# Patient Record
Sex: FEMALE | Race: BLACK OR AFRICAN AMERICAN | Employment: OTHER | URBAN - METROPOLITAN AREA
[De-identification: names, ages, dates, MRNs, and addresses within clinical notes are randomized per-mention and may not be internally consistent; named-entity substitution may affect disease eponyms.]

---

## 2017-05-09 ENCOUNTER — HOSPITAL ENCOUNTER (OUTPATIENT)
Dept: NON INVASIVE DIAGNOSTICS | Age: 82
Discharge: HOME | End: 2017-05-09
Attending: INTERNAL MEDICINE
Payer: MEDICARE

## 2017-05-09 DIAGNOSIS — I10 HYPERTENSION: ICD-10-CM

## 2017-05-09 DIAGNOSIS — R60.0 EDEMA EXTREMITIES: ICD-10-CM

## 2017-05-09 PROCEDURE — 93306 TTE W/DOPPLER COMPLETE: CPT

## 2017-06-28 ENCOUNTER — OFFICE VISIT (OUTPATIENT)
Dept: CARDIOLOGY CLINIC | Age: 82
End: 2017-06-28

## 2017-06-28 VITALS
WEIGHT: 146 LBS | DIASTOLIC BLOOD PRESSURE: 74 MMHG | SYSTOLIC BLOOD PRESSURE: 110 MMHG | BODY MASS INDEX: 26.87 KG/M2 | HEIGHT: 62 IN | HEART RATE: 62 BPM

## 2017-06-28 DIAGNOSIS — I34.0 NON-RHEUMATIC MITRAL REGURGITATION: ICD-10-CM

## 2017-06-28 DIAGNOSIS — I50.21 SYSTOLIC CHF, ACUTE (HCC): Primary | ICD-10-CM

## 2017-06-28 DIAGNOSIS — I36.1 NON-RHEUMATIC TRICUSPID VALVE INSUFFICIENCY: ICD-10-CM

## 2017-06-28 DIAGNOSIS — E78.5 HYPERLIPIDEMIA, UNSPECIFIED HYPERLIPIDEMIA TYPE: ICD-10-CM

## 2017-06-28 DIAGNOSIS — I10 ESSENTIAL HYPERTENSION: ICD-10-CM

## 2017-06-28 DIAGNOSIS — I42.9 CARDIOMYOPATHY, UNSPECIFIED TYPE (HCC): ICD-10-CM

## 2017-06-28 RX ORDER — BISMUTH SUBSALICYLATE 262 MG
1 TABLET,CHEWABLE ORAL DAILY
COMMUNITY

## 2017-06-28 RX ORDER — LISINOPRIL 5 MG/1
5 TABLET ORAL DAILY
Qty: 30 TAB | Refills: 6 | Status: SHIPPED | OUTPATIENT
Start: 2017-06-28 | End: 2017-08-28 | Stop reason: SDUPTHER

## 2017-06-28 RX ORDER — FUROSEMIDE 40 MG/1
TABLET ORAL DAILY
COMMUNITY
End: 2017-07-26 | Stop reason: SDUPTHER

## 2017-06-28 RX ORDER — CARVEDILOL 3.12 MG/1
3.12 TABLET ORAL 2 TIMES DAILY WITH MEALS
Qty: 60 TAB | Refills: 6 | Status: SHIPPED | OUTPATIENT
Start: 2017-06-28 | End: 2017-09-06 | Stop reason: SDUPTHER

## 2017-06-28 RX ORDER — METOPROLOL SUCCINATE 25 MG/1
TABLET, EXTENDED RELEASE ORAL DAILY
COMMUNITY
End: 2017-06-28 | Stop reason: ALTCHOICE

## 2017-06-28 RX ORDER — VITAMIN E CAP 100 UNIT 100 UNIT
CAP ORAL DAILY
COMMUNITY

## 2017-06-28 RX ORDER — ASPIRIN 81 MG/1
TABLET ORAL DAILY
COMMUNITY

## 2017-06-28 NOTE — LETTER
[employer] 1934 6/28/2017 Dear Panchito Mcgregor MD 
 
I had the pleasure of evaluating  Ms. Luis Enriquez in office today. Below are the relevant portions of my assessment and plan of care. ICD-10-CM ICD-9-CM 1. Systolic CHF, acute (HCC) I50.21 428.21 SCHEDULE NUCLEAR STUDY  
  723.1 METABOLIC PANEL, BASIC  
 new increase sob and edema 2. Cardiomyopathy, unspecified type I42.9 425.4   
 ? etiology 
ischemic v/s non 3. Non-rheumatic mitral regurgitation I34.0 424.0   
 moderate 4. Non-rheumatic tricuspid valve insufficiency I36.1 424.2   
 severe 5. Essential hypertension I10 401.9   
 stable 6. Hyperlipidemia, unspecified hyperlipidemia type E78.5 272.4 Current Outpatient Prescriptions Medication Sig Dispense Refill  aspirin delayed-release 81 mg tablet Take  by mouth daily.  furosemide (LASIX) 40 mg tablet Take  by mouth daily.  potassium 99 mg tablet Take 99 mg by mouth daily.  multivitamin (ONE A DAY) tablet Take 1 Tab by mouth daily.  vitamin e (E GEMS) 100 unit capsule Take  by mouth daily.  carvedilol (COREG) 3.125 mg tablet Take 1 Tab by mouth two (2) times daily (with meals). 60 Tab 6  
 lisinopril (PRINIVIL, ZESTRIL) 5 mg tablet Take 1 Tab by mouth daily. 30 Tab 6 Orders Placed This Encounter  METABOLIC PANEL, BASIC Standing Status:   Future Standing Expiration Date:   7/28/2017  
 SCHEDULE NUCLEAR STUDY  
  lexiscan stress test  
  Standing Status:   Future Standing Expiration Date:   6/28/2018  aspirin delayed-release 81 mg tablet Sig: Take  by mouth daily.  DISCONTD: metoprolol succinate (TOPROL-XL) 25 mg XL tablet Sig: Take  by mouth daily.  furosemide (LASIX) 40 mg tablet Sig: Take  by mouth daily.  potassium 99 mg tablet Sig: Take 99 mg by mouth daily.  multivitamin (ONE A DAY) tablet Sig: Take 1 Tab by mouth daily.  vitamin e (E GEMS) 100 unit capsule Sig: Take  by mouth daily.  carvedilol (COREG) 3.125 mg tablet Sig: Take 1 Tab by mouth two (2) times daily (with meals). Dispense:  60 Tab Refill:  6  
 lisinopril (PRINIVIL, ZESTRIL) 5 mg tablet Sig: Take 1 Tab by mouth daily. Dispense:  30 Tab Refill:  6 If you have questions, please do not hesitate to call me. I look forward to following Ms. Warner along with you. Sincerely, Alma Salamanca MD

## 2017-06-28 NOTE — PROGRESS NOTES
HISTORY OF PRESENT ILLNESS  Nella Pantoja is a 80 y.o. female. Shortness of Breath   The history is provided by the patient. This is a new problem. The problem occurs frequently. Episode onset: 3 months. The problem has been gradually worsening. Pertinent negatives include no fever, no headaches, no cough, no sputum production, no hemoptysis, no wheezing, no PND, no orthopnea, no chest pain, no vomiting, no abdominal pain, no rash, no leg swelling and no claudication. Precipitated by: exertion. New Patient   The history is provided by the patient. Associated symptoms include shortness of breath. Pertinent negatives include no chest pain, no abdominal pain and no headaches. Cardiomyopathy   The history is provided by the patient. This is a new problem. The problem occurs constantly. The problem has not changed since onset. Associated symptoms include shortness of breath. Pertinent negatives include no chest pain, no abdominal pain and no headaches. Nothing aggravates the symptoms. CHF   The history is provided by the patient. This is a new problem. The problem occurs constantly. The problem has not changed since onset. Associated symptoms include shortness of breath. Pertinent negatives include no chest pain, no abdominal pain and no headaches. The symptoms are aggravated by exertion and walking. The symptoms are relieved by rest.       Review of Systems   Constitutional: Negative for chills and fever. HENT: Negative for nosebleeds. Eyes: Negative for blurred vision and double vision. Respiratory: Positive for shortness of breath. Negative for cough, hemoptysis, sputum production and wheezing. Cardiovascular: Negative for chest pain, palpitations, orthopnea, claudication, leg swelling and PND. Gastrointestinal: Negative for abdominal pain, heartburn, nausea and vomiting. Musculoskeletal: Negative for myalgias. Skin: Negative for rash.    Neurological: Negative for dizziness, weakness and headaches. Endo/Heme/Allergies: Does not bruise/bleed easily. Family History   Problem Relation Age of Onset    Heart Attack Mother     No Known Problems Father        Past Medical History:   Diagnosis Date    Essential hypertension     Hyperlipidemia        History reviewed. No pertinent surgical history. Social History   Substance Use Topics    Smoking status: Never Smoker    Smokeless tobacco: Never Used    Alcohol use No       No Known Allergies    Prior to Admission medications    Medication Sig Start Date End Date Taking? Authorizing Provider   aspirin delayed-release 81 mg tablet Take  by mouth daily. Yes Historical Provider   furosemide (LASIX) 40 mg tablet Take  by mouth daily. Yes Historical Provider   potassium 99 mg tablet Take 99 mg by mouth daily. Yes Historical Provider   multivitamin (ONE A DAY) tablet Take 1 Tab by mouth daily. Yes Historical Provider   vitamin e (E GEMS) 100 unit capsule Take  by mouth daily. Yes Historical Provider   carvedilol (COREG) 3.125 mg tablet Take 1 Tab by mouth two (2) times daily (with meals). 6/28/17  Yes Celestino Rahman MD   lisinopril (PRINIVIL, ZESTRIL) 5 mg tablet Take 1 Tab by mouth daily. 6/28/17  Yes Celestino Rahman MD         Visit Vitals    /74    Pulse 62    Ht 5' 2\" (1.575 m)    Wt 66.2 kg (146 lb)    BMI 26.7 kg/m2         Physical Exam   Constitutional: She is oriented to person, place, and time. She appears well-developed and well-nourished. HENT:   Head: Normocephalic and atraumatic. Eyes: Conjunctivae are normal.   Neck: Neck supple. No JVD present. No tracheal deviation present. No thyromegaly present. Cardiovascular: Normal rate and regular rhythm. PMI is not displaced. Exam reveals no gallop, no S3 and no decreased pulses. No murmur heard. Pulmonary/Chest: No respiratory distress. She has no wheezes. She has no rales. She exhibits no tenderness. Abdominal: Soft. There is no tenderness. Musculoskeletal: She exhibits no edema. Neurological: She is alert and oriented to person, place, and time. Skin: Skin is warm. Psychiatric: She has a normal mood and affect. Ms. Corby Rodríguez has a reminder for a \"due or due soon\" health maintenance. I have asked that she contact her primary care provider for follow-up on this health maintenance. I have personally reviewed patients ekg done at other facility. ,emiliawp-6/2017  SUMMARY:echo-5/2017  Left ventricle: Systolic function was severely reduced. Ejection fraction  was estimated to be 20 %. There was severe diffuse hypokinesis. Wall  thickness was mildly increased. Features were consistent with a  pseudonormal left ventricular filling pattern, with concomitant abnormal  relaxation and increased filling pressure (grade 2 diastolic dysfunction). Ventricular septum: There was paradoxical motion. These changes are  consistent with RV volume overload. Right ventricle: The ventricle was dilated. Systolic function was reduced. Systolic pressure could not be accurately determined, but appeared to be  increased. Left atrium: The atrium was severely dilated. Right atrium: The atrium was dilated. Mitral valve: There was moderate regurgitation. Tricuspid valve: There was moderate to severe regurgitation. Assessment         ICD-10-CM ICD-9-CM    1. Systolic CHF, acute (HCC) I50.21 428.21 SCHEDULE NUCLEAR STUDY     752.0 METABOLIC PANEL, BASIC    new increase sob and edema   2. Cardiomyopathy, unspecified type I42.9 425.4     ? etiology  ischemic v/s non   3. Non-rheumatic mitral regurgitation I34.0 424.0     moderate     4. Non-rheumatic tricuspid valve insufficiency I36.1 424.2     severe   5. Essential hypertension I10 401.9     stable   6.  Hyperlipidemia, unspecified hyperlipidemia type E78.5 272.4        Medications Discontinued During This Encounter   Medication Reason    metoprolol succinate (TOPROL-XL) 25 mg XL tablet Alternate Therapy       Orders Placed This Encounter    METABOLIC PANEL, BASIC     Standing Status:   Future     Standing Expiration Date:   7/28/2017    SCHEDULE NUCLEAR STUDY     lexiscan stress test     Standing Status:   Future     Standing Expiration Date:   6/28/2018    carvedilol (COREG) 3.125 mg tablet     Sig: Take 1 Tab by mouth two (2) times daily (with meals). Dispense:  60 Tab     Refill:  6    lisinopril (PRINIVIL, ZESTRIL) 5 mg tablet     Sig: Take 1 Tab by mouth daily. Dispense:  30 Tab     Refill:  6       Follow-up Disposition:  Return for F/u after tests.

## 2017-06-28 NOTE — MR AVS SNAPSHOT
Visit Information Date & Time Provider Department Dept. Phone Encounter #  
 6/28/2017  9:45 AM Belinda DeG uzman MD [facility] [address] [place] [address] [id number] Follow-up Instructions Return for F/u after tests. Your Appointments 7/26/2017  9:30 AM  
PROCEDURE with CA NUC [facility] (Mikaela Washington) Appt Note: saleh yasmin [address], [address] [facility] [address]  
[address], [address] [place] [address]  
  
    
 8/2/2017  9:30 AM  
Office Visit with Belinda De Guzman MD  
[facility]) Appt Note: post nuc and bmp [address], [address] [facility] [address]  
[address], [address] [place] [address] Upcoming Health Maintenance Date Due DTaP/Tdap/Td series (1 - Tdap) 8/20/1955 ZOSTER VACCINE AGE 60> 8/20/1994 GLAUCOMA SCREENING Q2Y 8/20/1999 OSTEOPOROSIS SCREENING (DEXA) 8/20/1999 Pneumococcal 65+ Low/Medium Risk (1 of 2 - PCV13) 8/20/1999 MEDICARE YEARLY EXAM 8/20/1999 INFLUENZA AGE 9 TO ADULT 8/1/2017 Allergies as of 6/28/2017  Review Complete On: 6/28/2017 By: Belinda De Guzman MD  
 No Known Allergies Current Immunizations  Never Reviewed No immunizations on file. Not reviewed this visit You Were Diagnosed With   
  
 Codes Comments Systolic CHF, acute ([facility]    -  Primary ICD-10-CM: I50.21 ICD-9-CM: 428.21, 428.0 new increase sob and edema Cardiomyopathy, unspecified type     ICD-10-CM: I42.9 ICD-9-CM: 425.4 ? etiology 
ischemic v/s non Non-rheumatic mitral regurgitation     ICD-10-CM: I34.0 ICD-9-CM: 424.0 moderate Non-rheumatic tricuspid valve insufficiency     ICD-10-CM: I36.1 ICD-9-CM: 424.2 severe Essential hypertension     ICD-10-CM: I10 
ICD-9-CM: 401.9 stable Hyperlipidemia, unspecified hyperlipidemia type     ICD-10-CM: E78.5 ICD-9-CM: 272.4 Vitals BP Pulse Height(growth percentile) Weight(growth percentile) BMI Smoking Status 110/74 62 5' 2\" (1.575 m) 146 lb (66.2 kg) 26.7 kg/m2 Never Smoker Vitals History BMI and BSA Data Body Mass Index Body Surface Area  
 26.7 kg/m 2 1.7 m 2 Preferred Pharmacy Pharmacy Name Phone [facility] [address] [phone] Your Updated Medication List  
  
   
This list is accurate as of: 6/28/17 10:33 AM.  Always use your most recent med list.  
  
  
  
  
 aspirin delayed-release 81 mg tablet Take  by mouth daily. carvedilol 3.125 mg tablet Commonly known as:  Daija Mateusz Take 1 Tab by mouth two (2) times daily (with meals). furosemide 40 mg tablet Commonly known as:  LASIX Take  by mouth daily. lisinopril 5 mg tablet Commonly known as:  Jodean Embs Take 1 Tab by mouth daily. multivitamin tablet Commonly known as:  ONE A DAY Take 1 Tab by mouth daily. potassium 99 mg tablet Take 99 mg by mouth daily. vitamin e 100 unit capsule Commonly known as:  E GEMS Take  by mouth daily. Prescriptions Sent to Pharmacy Refills  
 carvedilol (COREG) 3.125 mg tablet 6 Sig: Take 1 Tab by mouth two (2) times daily (with meals). Class: Normal  
 Pharmacy: [id number] [place] [address]. Ph #: [phone] Route: Oral  
 lisinopril (PRINIVIL, ZESTRIL) 5 mg tablet 6 Sig: Take 1 Tab by mouth daily. Class: Normal  
 Pharmacy: [id number] [place] [address]. Ph #: [phone] Route: Oral  
  
Follow-up Instructions Return for F/u after tests. To-Do List   
 07/05/2017 Lab:  METABOLIC PANEL, BASIC   
  
 07/05/2017 Nursing:  SCHEDULE NUCLEAR STUDY Introducing hospitals & HEALTH SERVICES!    
 [employer] introduces [employer] patient portal. Now you can access parts of your medical record, email your doctor's office, and request medication refills online. 1. In your internet browser, go to [email or web address] 2. Click on the First Time User? Click Here link in the Sign In box. You will see the New Member Sign Up page. 3. Enter your Idiro Access Code exactly as it appears below. You will not need to use this code after youve completed the sign-up process. If you do not sign up before the expiration date, you must request a new code. · Idiro Access Code: [email or web address] Expires: 9/26/2017 10:03 AM 
 
4. Enter the last four digits of your Social Security Number (xxxx) and Date of Birth (mm/dd/yyyy) as indicated and click Submit. You will be taken to the next sign-up page. 5. Create a Idiro ID. This will be your Idiro login ID and cannot be changed, so think of one that is secure and easy to remember. 6. Create a Idiro password. You can change your password at any time. 7. Enter your Password Reset Question and Answer. This can be used at a later time if you forget your password. 8. Enter your e-mail address. You will receive e-mail notification when new information is available in [address] 9. Click Sign Up. You can now view and download portions of your medical record. 10. Click the Download Summary menu link to download a portable copy of your medical information. If you have questions, please visit the Frequently Asked Questions section of the Idiro website. Remember, Idiro is NOT to be used for urgent needs. For medical emergencies, dial 911. Now available from your iPhone and Android! Please provide this summary of care documentation to your next provider. Your primary care clinician is listed as SITA KIMBLE. If you have any questions after today's visit, please call [phone].

## 2017-07-26 ENCOUNTER — CLINICAL SUPPORT (OUTPATIENT)
Dept: CARDIOLOGY CLINIC | Age: 82
End: 2017-07-26

## 2017-07-26 DIAGNOSIS — I50.21 SYSTOLIC CHF, ACUTE (HCC): Primary | ICD-10-CM

## 2017-07-26 DIAGNOSIS — I42.9 CARDIOMYOPATHY, UNSPECIFIED TYPE (HCC): ICD-10-CM

## 2017-07-26 DIAGNOSIS — I34.0 NON-RHEUMATIC MITRAL REGURGITATION: ICD-10-CM

## 2017-07-26 DIAGNOSIS — I10 ESSENTIAL HYPERTENSION: ICD-10-CM

## 2017-07-26 RX ORDER — FUROSEMIDE 40 MG/1
40 TABLET ORAL 2 TIMES DAILY
Qty: 60 TAB | Refills: 6 | Status: SHIPPED | OUTPATIENT
Start: 2017-07-26

## 2017-07-26 NOTE — PROGRESS NOTES
[facility]  [address], [address] [place] [address], [place], [address] [place] [address]  [phone] [place]  [phone] [place]       Name: Marlene Mccall         MRN#: [id number]        YOB: 1934   Gender: female Ht:5'2\" Wt:146 lbs       . Date of Rest/Stress Images: 7/26/2017   Referring Physician: Srinivasan Venegas MD  Ordering Physician: Tali Dewitt. Ramone Patel MD, [facility] [place]  Technologist: Chuck Gaucher. RIO Palacios, C.N.M.T  Diagnosis:  1. Systolic CHF, acute ([address]    2. Cardiomyopathy, unspecified type ([address]    3. Non-rheumatic mitral regurgitation    4. Essential hypertension          Rest/Stress Myoview SPECT Myocardial Perfusion Imaging with  Lexiscan Stress and gated SPECT Imaging      PROCEDURE:      Myocardial perfusion imaging was performed at rest approximately 30 mins following the intravenous injection,(Right hand ) of 11.8 mCi of Tc99m Myoview for evaluation of myocardial function and perfusion at rest.    Baseline Data:    Baseline EKG reveals sinus rhythm, low voltage, leftward axis. Baseline heart rate is 66. Baseline blood pressure is 132/64. Procedure: The patient was injected with 0.4 mg IV Lexiscan over a 30 second period. The patient had no significant symptoms. Heart rate increased from baseline to a heart rate of 71. Blood pressure decreased to 120/74. Electrocardiogram showed no significant ST-T changes. Diagnosis:   1. Nondiagnostic EKG portion of Lexiscan stress test.   2. Nuclear imaging report to follow. Pharmacological:  Patient was injected with . 4 mg/mL with Lexiscan intravenously over a period 10 to 20 sec. After pharmacologic stress, the patient was injected intravenously with 38.5 mCi of Tc99m Myoview. Gating post stress tomographic imaging performed approximately 45 minutes post tracer injection.  The data was reconstructed in the short, horizontal long and vertical long axis views and tomographic slices were generated. NUCLEAR IMAGING:    Findings:  1. Post-stress imaging in short axis, horizontal and vertical long axis views reveals normal perfusion in all areas. 2. Resting images also show normal perfusion in all areas. 3. Gated images show severe global hypokinesis, severely reduced systolic function. The ejection fraction is 25%. Diagnosis:   1. Abnormal scan. 2. Normal perfusion scan. 3. Global hypokinesis with severely reduced systolic function indicating nonischemic cardiomyopathy. 4. Moderate risk scan. 4. Moderate risk scan. Thank you for the referral.    E-signed and Interpreting Physician:    Dominique Gerber.  Valerie Reyna MD, [facility] [place]     Date of interpretation: 7/26/2017  Date of final report: 7/26/2017

## 2017-08-28 RX ORDER — LISINOPRIL 5 MG/1
5 TABLET ORAL DAILY
Qty: 30 TAB | Refills: 6 | Status: SHIPPED | OUTPATIENT
Start: 2017-08-28 | End: 2017-09-06 | Stop reason: SDUPTHER

## 2017-09-06 ENCOUNTER — OFFICE VISIT (OUTPATIENT)
Dept: CARDIOLOGY CLINIC | Age: 82
End: 2017-09-06

## 2017-09-06 VITALS
HEIGHT: 62 IN | SYSTOLIC BLOOD PRESSURE: 129 MMHG | DIASTOLIC BLOOD PRESSURE: 83 MMHG | WEIGHT: 135 LBS | BODY MASS INDEX: 24.84 KG/M2 | HEART RATE: 77 BPM

## 2017-09-06 DIAGNOSIS — I34.0 NON-RHEUMATIC MITRAL REGURGITATION: ICD-10-CM

## 2017-09-06 DIAGNOSIS — I10 ESSENTIAL HYPERTENSION: ICD-10-CM

## 2017-09-06 DIAGNOSIS — I42.9 CARDIOMYOPATHY, UNSPECIFIED TYPE (HCC): ICD-10-CM

## 2017-09-06 DIAGNOSIS — I50.21 SYSTOLIC CHF, ACUTE (HCC): Primary | ICD-10-CM

## 2017-09-06 RX ORDER — LISINOPRIL 10 MG/1
10 TABLET ORAL DAILY
Qty: 30 TAB | Refills: 6 | Status: SHIPPED | OUTPATIENT
Start: 2017-09-06 | End: 2017-09-25 | Stop reason: SDUPTHER

## 2017-09-06 RX ORDER — CARVEDILOL 6.25 MG/1
6.25 TABLET ORAL 2 TIMES DAILY WITH MEALS
Qty: 60 TAB | Refills: 6 | Status: SHIPPED | OUTPATIENT
Start: 2017-09-06 | End: 2017-11-01

## 2017-09-06 NOTE — MR AVS SNAPSHOT
Visit Information Date & Time Provider Department Dept. Phone Encounter #  
 9/6/2017 11:15 AM Benny Swift MD [facility] [id number] Follow-up Instructions Return in about 8 weeks (around 11/1/2017). Your Appointments 9/27/2017 12:45 PM  
PROCEDURE with CA ECHO [facility] [place] ([address]) Appt Note: [address], [address] [place] [address]  
[address], [address] [address]  
  
    
 10/18/2017 11:30 AM  
ESTABLISHED PATIENT with Benny Swift MD  
[facility]) Appt Note: 8 weeks post echo [address], [address] [place] [address]  
[address], [address] [address]  [address] Upcoming Health Maintenance Date Due DTaP/Tdap/Td series (1 - Tdap) 8/20/1955 ZOSTER VACCINE AGE 60> 6/20/1994 GLAUCOMA SCREENING Q2Y 8/20/1999 OSTEOPOROSIS SCREENING (DEXA) 8/20/1999 Pneumococcal 65+ Low/Medium Risk (1 of 2 - PCV13) 8/20/1999 MEDICARE YEARLY EXAM 8/20/1999 INFLUENZA AGE 9 TO ADULT 8/1/2017 Allergies as of 9/6/2017  Review Complete On: 9/6/2017 By: Benny Swift MD  
 No Known Allergies Current Immunizations  Never Reviewed No immunizations on file. Not reviewed this visit You Were Diagnosed With   
  
 Codes Comments Systolic [facility], [facility] ([facility]    -  Primary ICD-10-CM: I50.21 ICD-9-CM: 428.21, 428.0 sob improving 
adjust meds Cardiomyopathy, unspecified type ([facility]     ICD-10-CM: I42.9 ICD-9-CM: 425.4 non ischemic Non-rheumatic mitral regurgitation     ICD-10-CM: I34.0 ICD-9-CM: 424.0 stable Essential hypertension     ICD-10-CM: I10 
ICD-9-CM: 401.9 controlled Vitals BP Pulse Height(growth percentile) Weight(growth percentile) BMI Smoking Status 129/83 77 5' 2\" (1.575 m) 135 lb (61.2 kg) 24.69 kg/m2 Never Smoker Vitals History BMI and BSA Data Body Mass Index Body Surface Area  
 24.69 kg/m 2 1.64 m 2 Preferred Pharmacy Pharmacy Name Phone [facility] [address]. [phone] Your Updated Medication List  
  
   
This list is accurate as of: 9/6/17 11:59 AM.  Always use your most recent med list.  
  
  
  
  
 aspirin delayed-release 81 mg tablet Take  by mouth daily. carvedilol 6.25 mg tablet Commonly known as:  Ruthann Rod Take 1 Tab by mouth two (2) times daily (with meals). furosemide 40 mg tablet Commonly known as:  LASIX Take 1 Tab by mouth two (2) times a day. lisinopril 10 mg tablet Commonly known as:  Tanya Pan Take 1 Tab by mouth daily. multivitamin tablet Commonly known as:  ONE A DAY Take 1 Tab by mouth daily. potassium 99 mg tablet Take 99 mg by mouth daily. vitamin e 100 unit capsule Commonly known as:  E GEMS Take  by mouth daily. Prescriptions Sent to Pharmacy Refills  
 lisinopril (PRINIVIL, ZESTRIL) 10 mg tablet 6 Sig: Take 1 Tab by mouth daily. Class: Normal  
 Pharmacy: [address] [place] [address]. Ph #: [phone] Route: Oral  
 carvedilol (COREG) 6.25 mg tablet 6 Sig: Take 1 Tab by mouth two (2) times daily (with meals). Class: Normal  
 Pharmacy: [address] [place] [address]. Ph #: [phone] Route: Oral  
  
Follow-up Instructions Return in about 8 weeks (around 11/1/2017). To-Do List   
 10/17/2017 Cardiac Services:  2D ECHO COMPLETE ADULT (TTE) Introducing [facility] & HEALTH SERVICES! [address] introduces [employer] patient portal. Now you can access parts of your medical record, email your doctor's office, and request medication refills online. 1. In your internet browser, go to [email or web address] 2. Click on the First Time User? Click Here link in the Sign In box. You will see the New Member Sign Up page. 3. Enter your CodeRyte Access Code exactly as it appears below. You will not need to use this code after youve completed the sign-up process. If you do not sign up before the expiration date, you must request a new code. · CodeR[employer]te Access Code: [email or web address] Expires: 9/26/2017 10:03 AM 
 
4. Enter the last four digits of your Social Security Number (xxxx) and Date of Birth (mm/dd/yyyy) as indicated and click Submit. You will be taken to the next sign-up page. 5. Create a CodeRyte ID. This will be your CodeR[employer]te login ID and cannot be changed, so think of one that is secure and easy to remember. 6. Create a CodeRyte password. You can change your password at any time. 7. Enter your Password Reset Question and Answer. This can be used at a later time if you forget your password. 8. Enter your e-mail address. You will receive e-mail notification when new information is available in [address] 9. Click Sign Up. You can now view and download portions of your medical record. 10. Click the Download Summary menu link to download a portable copy of your medical information. If you have questions, please visit the Frequently Asked Questions section of the CodeR[employer] website. Remember, CodeRyte is NOT to be used for urgent needs. For medical emergencies, dial 911. Now available from your iPhone and Android! Please provide this summary of care documentation to your next provider. Your primary care clinician is listed as [address] [place] [address]. If you have any questions after today's visit, please call [phone].

## 2017-09-06 NOTE — PROGRESS NOTES
HISTORY OF PRESENT ILLNESS  Yobany Lawson is a 80 y.o. female. HPI Comments: Patient is here for follow up of diagnostic tests. Results will be discussed. CHF   The history is provided by the patient. This is a new problem. The problem occurs constantly. The problem has not changed since onset. Associated symptoms include shortness of breath. Pertinent negatives include no chest pain, no abdominal pain and no headaches. The symptoms are aggravated by exertion and walking. The symptoms are relieved by rest.   Cardiomyopathy   The history is provided by the patient. This is a new problem. The problem occurs constantly. The problem has not changed since onset. Associated symptoms include shortness of breath. Pertinent negatives include no chest pain, no abdominal pain and no headaches. Nothing aggravates the symptoms. Valvular Heart Disease   Associated symptoms include shortness of breath. Pertinent negatives include no chest pain, no abdominal pain and no headaches. Hypertension   Associated symptoms include shortness of breath. Pertinent negatives include no chest pain, no abdominal pain and no headaches. Cholesterol Problem   Associated symptoms include shortness of breath. Pertinent negatives include no chest pain, no abdominal pain and no headaches. Shortness of Breath   The history is provided by the patient. This is a new problem. The problem occurs frequently. Episode onset: 3 months. The problem has been gradually worsening. Associated symptoms include leg swelling. Pertinent negatives include no fever, no headaches, no cough, no sputum production, no hemoptysis, no wheezing, no PND, no orthopnea, no chest pain, no vomiting, no abdominal pain, no rash and no claudication. Precipitated by: exertion. Review of Systems   Constitutional: Negative for chills and fever. HENT: Negative for nosebleeds. Eyes: Negative for blurred vision and double vision.    Respiratory: Positive for shortness of breath. Negative for cough, hemoptysis, sputum production and wheezing. Cardiovascular: Positive for leg swelling. Negative for chest pain, palpitations, orthopnea, claudication and PND. Gastrointestinal: Negative for abdominal pain, heartburn, nausea and vomiting. Musculoskeletal: Negative for myalgias. Skin: Negative for rash. Neurological: Negative for dizziness, weakness and headaches. Endo/Heme/Allergies: Does not bruise/bleed easily. Family History   Problem Relation Age of Onset    Heart Attack Mother     No Known Problems Father        Past Medical History:   Diagnosis Date    Essential hypertension     Hyperlipidemia        No past surgical history on file. Social History   Substance Use Topics    Smoking status: Never Smoker    Smokeless tobacco: Never Used    Alcohol use No       No Known Allergies    Prior to Admission medications    Medication Sig Start Date End Date Taking? Authorizing Provider   lisinopril (PRINIVIL, ZESTRIL) 10 mg tablet Take 1 Tab by mouth daily. 9/6/17  Yes Azael Glover MD   carvedilol (COREG) 6.25 mg tablet Take 1 Tab by mouth two (2) times daily (with meals). 9/6/17  Yes Azael Glover MD   furosemide (LASIX) 40 mg tablet Take 1 Tab by mouth two (2) times a day. 7/26/17  Yes Azael Glover MD   aspirin delayed-release 81 mg tablet Take  by mouth daily. Yes Historical Provider   potassium 99 mg tablet Take 99 mg by mouth daily. Yes Historical Provider   multivitamin (ONE A DAY) tablet Take 1 Tab by mouth daily. Yes Historical Provider   vitamin e (E GEMS) 100 unit capsule Take  by mouth daily. Yes Historical Provider         Visit Vitals    /83    Pulse 77    Ht 5' 2\" (1.575 m)    Wt 61.2 kg (135 lb)    BMI 24.69 kg/m2         Physical Exam   Constitutional: She is oriented to person, place, and time. She appears well-developed and well-nourished. HENT:   Head: Normocephalic and atraumatic.    Eyes: Conjunctivae are normal.   Neck: Neck supple. No JVD present. No tracheal deviation present. No thyromegaly present. Cardiovascular: Normal rate and regular rhythm. PMI is not displaced. Exam reveals no gallop, no S3 and no decreased pulses. No murmur heard. Pulmonary/Chest: No respiratory distress. She has no wheezes. She has no rales. She exhibits no tenderness. Abdominal: Soft. There is no tenderness. Musculoskeletal: She exhibits edema. Neurological: She is alert and oriented to person, place, and time. Skin: Skin is warm. Psychiatric: She has a normal mood and affect. Ms. Rosalba Barboza has a reminder for a \"due or due soon\" health maintenance. I have asked that she contact her primary care provider for follow-up on this health maintenance. I have personally reviewed patients ekg done at other facility. emilia Santoswp-2017  SUMMARY:echo-2017  Left ventricle: Systolic function was severely reduced. Ejection fraction  was estimated to be 20 %. There was severe diffuse hypokinesis. Wall  thickness was mildly increased. Features were consistent with a  pseudonormal left ventricular filling pattern, with concomitant abnormal  relaxation and increased filling pressure (grade 2 diastolic dysfunction). Ventricular septum: There was paradoxical motion. These changes are  consistent with RV volume overload. Right ventricle: The ventricle was dilated. Systolic function was reduced. Systolic pressure could not be accurately determined, but appeared to be  increased. Left atrium: The atrium was severely dilated. Right atrium: The atrium was dilated. Mitral valve: There was moderate regurgitation. Tricuspid valve: There was moderate to severe regurgitation. NUCLEAR IMAGIN2017     Findings:  1. Post-stress imaging in short axis, horizontal and vertical long axis views reveals normal perfusion in all areas. 2. Resting images also show normal perfusion in all areas.    3. Gated images show severe global hypokinesis, severely reduced systolic function. The ejection fraction is 25%. Diagnosis:   1. Abnormal scan. 2. Normal perfusion scan. 3. Global hypokinesis with severely reduced systolic function indicating nonischemic cardiomyopathy. 4. Moderate risk scan. 4. Moderate risk scan. Assessment         ICD-10-CM ICD-9-CM    1. Systolic CHF, acute (HCC) I50.21 428.21 2D ECHO COMPLETE ADULT (TTE)     428.0     sob improving  adjust meds   2. Cardiomyopathy, unspecified type ([facility] [address] I42.9 425.4     non ischemic   3. Non-rheumatic mitral regurgitation I34.0 424.0     stable   4. Essential hypertension I10 401.9     controlled   9/2017  Increase coreg and lisinopril  Echo in 7-8 weeks-decide on icd    Medications Discontinued During This Encounter   Medication Reason    lisinopril (PRINIVIL, ZESTRIL) 5 mg tablet Reorder    carvedilol (COREG) 3.125 mg tablet Reorder       Orders Placed This Encounter    2D ECHO COMPLETE ADULT (TTE)     Standing Status:   Future     Standing Expiration Date:   3/5/2018     Order Specific Question:   Reason for Exam:     Answer:   see diagnosis    lisinopril (PRINIVIL, ZESTRIL) 10 mg tablet     Sig: Take 1 Tab by mouth daily. Dispense:  30 Tab     Refill:  6    carvedilol (COREG) 6.25 mg tablet     Sig: Take 1 Tab by mouth two (2) times daily (with meals). Dispense:  60 Tab     Refill:  6       Follow-up Disposition:  Return in about 8 weeks (around 11/1/2017).

## 2017-09-06 NOTE — PROGRESS NOTES
1. Have you been to the ER, urgent care clinic since your last visit? Hospitalized since your last visit? No     2. Have you seen or consulted any other health care providers outside of the [address] [place] [address] since your last visit? Include any pap smears or colon screening. unsure     3. Since your last visit, have you had any of the following symptoms?     no    4. Have you had any blood work, X-rays or cardiac testing? No    5. Where do you normally have your labs drawn? PCP    6. Do you need any refills today?    no

## 2017-09-06 NOTE — LETTER
[employer] 1934 9/6/2017 Dear Jarrod Berry MD 
 
I had the pleasure of evaluating  Ms. Kathryn Pemberton in office today. Below are the relevant portions of my assessment and plan of care. ICD-10-CM ICD-9-CM 1. Systolic CHF, acute (HCC) I50.21 428.21 2D ECHO COMPLETE ADULT (TTE) 428.0   
 sob improving 
adjust meds 2. Cardiomyopathy, unspecified type ([address] I42.9 425.4   
 non ischemic 3. Non-rheumatic mitral regurgitation I34.0 424.0   
 stable 4. Essential hypertension I10 401.9   
 controlled Current Outpatient Prescriptions Medication Sig Dispense Refill  lisinopril (PRINIVIL, ZESTRIL) 10 mg tablet Take 1 Tab by mouth daily. 30 Tab 6  carvedilol (COREG) 6.25 mg tablet Take 1 Tab by mouth two (2) times daily (with meals). 60 Tab 6  furosemide (LASIX) 40 mg tablet Take 1 Tab by mouth two (2) times a day. 60 Tab 6  
 aspirin delayed-release 81 mg tablet Take  by mouth daily.  potassium 99 mg tablet Take 99 mg by mouth daily.  multivitamin (ONE A DAY) tablet Take 1 Tab by mouth daily.  vitamin e (E GEMS) 100 unit capsule Take  by mouth daily. Orders Placed This Encounter  2D ECHO COMPLETE ADULT (TTE) Standing Status:   Future Standing Expiration Date:   3/5/2018 Order Specific Question:   Reason for Exam: Answer:   see diagnosis  lisinopril (PRINIVIL, ZESTRIL) 10 mg tablet Sig: Take 1 Tab by mouth daily. Dispense:  30 Tab Refill:  6  
 carvedilol (COREG) 6.25 mg tablet Sig: Take 1 Tab by mouth two (2) times daily (with meals). Dispense:  60 Tab Refill:  6 If you have questions, please do not hesitate to call me. I look forward to following Ms. Warner along with you. Sincerely, Faye Shirley MD

## 2017-09-12 ENCOUNTER — TELEPHONE (OUTPATIENT)
Dept: CARDIOLOGY CLINIC | Age: 82
End: 2017-09-12

## 2017-09-12 DIAGNOSIS — I50.21 SYSTOLIC CHF, ACUTE (HCC): Primary | ICD-10-CM

## 2017-09-12 NOTE — TELEPHONE ENCOUNTER
Daughter called and states that since increase of Lisinopril and Coreg patient is feeling sluggish, dizzy and tired. /87 P 56.  Please advise

## 2017-09-25 RX ORDER — LISINOPRIL 10 MG/1
10 TABLET ORAL DAILY
Qty: 90 TAB | Refills: 1 | Status: SHIPPED | OUTPATIENT
Start: 2017-09-25 | End: 2017-11-01

## 2017-10-18 ENCOUNTER — CLINICAL SUPPORT (OUTPATIENT)
Dept: CARDIOLOGY CLINIC | Age: 82
End: 2017-10-18

## 2017-10-18 DIAGNOSIS — I50.21 SYSTOLIC CHF, ACUTE (HCC): ICD-10-CM

## 2017-11-01 ENCOUNTER — OFFICE VISIT (OUTPATIENT)
Dept: CARDIOLOGY CLINIC | Age: 82
End: 2017-11-01

## 2017-11-01 VITALS
DIASTOLIC BLOOD PRESSURE: 69 MMHG | HEIGHT: 62 IN | SYSTOLIC BLOOD PRESSURE: 111 MMHG | BODY MASS INDEX: 20.43 KG/M2 | HEART RATE: 69 BPM | WEIGHT: 111 LBS

## 2017-11-01 DIAGNOSIS — I36.1 NON-RHEUMATIC TRICUSPID VALVE INSUFFICIENCY: ICD-10-CM

## 2017-11-01 DIAGNOSIS — I50.21 SYSTOLIC CHF, ACUTE (HCC): Primary | ICD-10-CM

## 2017-11-01 DIAGNOSIS — I34.0 NON-RHEUMATIC MITRAL REGURGITATION: ICD-10-CM

## 2017-11-01 DIAGNOSIS — I10 ESSENTIAL HYPERTENSION: ICD-10-CM

## 2017-11-01 DIAGNOSIS — I42.9 CARDIOMYOPATHY, UNSPECIFIED TYPE (HCC): ICD-10-CM

## 2017-11-01 RX ORDER — CARVEDILOL 3.12 MG/1
TABLET ORAL 2 TIMES DAILY WITH MEALS
COMMUNITY

## 2017-11-01 RX ORDER — LISINOPRIL 5 MG/1
TABLET ORAL DAILY
COMMUNITY

## 2017-11-01 NOTE — LETTER
[employer] 1934 11/1/2017 Dear Hilaria Horn MD 
 
I had the pleasure of evaluating  Ms. Renetta Ovalle in office today. Below are the relevant portions of my assessment and plan of care. ICD-10-CM ICD-9-CM 1. Systolic CHF, acute (HCC) I50.21 428.21   
  428.0   
 still fatigue nyha class3 2. Cardiomyopathy, unspecified type ([address] I42.9 425.4   
 ef remains at 20% 3. Non-rheumatic mitral regurgitation I34.0 424.0   
 stable 4. Non-rheumatic tricuspid valve insufficiency I36.1 424.2   
 stable 5. Essential hypertension I10 401.9   
 controlled Current Outpatient Prescriptions Medication Sig Dispense Refill  carvedilol (COREG) 3.125 mg tablet Take  by mouth two (2) times daily (with meals).  lisinopril (PRINIVIL, ZESTRIL) 5 mg tablet Take  by mouth daily.  furosemide (LASIX) 40 mg tablet Take 1 Tab by mouth two (2) times a day. 60 Tab 6  
 aspirin delayed-release 81 mg tablet Take  by mouth daily.  potassium 99 mg tablet Take 99 mg by mouth daily.  multivitamin (ONE A DAY) tablet Take 1 Tab by mouth daily.  vitamin e (E GEMS) 100 unit capsule Take  by mouth daily. Orders Placed This Encounter  carvedilol (COREG) 3.125 mg tablet Sig: Take  by mouth two (2) times daily (with meals).  lisinopril (PRINIVIL, ZESTRIL) 5 mg tablet Sig: Take  by mouth daily. If you have questions, please do not hesitate to call me. I look forward to following Ms. Warner along with you. Sincerely, Sushila Lemus MD

## 2017-11-01 NOTE — MR AVS SNAPSHOT
Visit Information Date & Time Provider Department Dept. Phone Encounter #  
 11/1/2017  1:00 PM Rory Bowser MD [facility] [address] [place] [address] [id number] Follow-up Instructions Return in about 3 months (around 2/1/2018). Your Appointments 2/14/2018 12:00 PM  
ESTABLISHED PATIENT with Rory Bowser MD  
[facility]) Appt Note: 3 months [address] [facility], [address] [place] [address]  
[facility], [address] [place], [place] [address]a [place] Upcoming Health Maintenance Date Due DTaP/Tdap/Td series (1 - Tdap) 8/20/1955 ZOSTER VACCINE AGE 60> 6/20/1994 GLAUCOMA SCREENING Q2Y 8/20/1999 OSTEOPOROSIS SCREENING (DEXA) 8/20/1999 Pneumococcal 65+ Low/Medium Risk (1 of 2 - PCV13) 8/20/1999 MEDICARE YEARLY EXAM 8/20/1999 INFLUENZA AGE 9 TO ADULT 8/1/2017 Allergies as of 11/1/2017  Review Complete On: 11/1/2017 By: Rory Bowser MD  
 No Known Allergies Current Immunizations  Never Reviewed No immunizations on file. Not reviewed this visit You Were Diagnosed With   
  
 Codes Comments Systolic CHF, acute ([facility] [address]    -  Primary ICD-10-CM: I50.21 ICD-9-CM: 428.21, 428.0 still fatigue nyha class3 Cardiomyopathy, unspecified type ([facility] [address]     ICD-10-CM: I42.9 ICD-9-CM: 425.4 ef remains at 20% Non-rheumatic mitral regurgitation     ICD-10-CM: I34.0 ICD-9-CM: 424.0 stable Non-rheumatic tricuspid valve insufficiency     ICD-10-CM: I36.1 ICD-9-CM: 424.2 stable Essential hypertension     ICD-10-CM: I10 
ICD-9-CM: 401.9 controlled Vitals BP Pulse Height(growth percentile) Weight(growth percentile) BMI Smoking Status 111/69 69 5' 2\" (1.575 m) 111 lb (50.3 kg) 20.3 kg/m2 Never Smoker Vitals History BMI and BSA Data Body Mass Index Body Surface Area  
 20.3 kg/m 2 1.48 m 2 Preferred Pharmacy Pharmacy Name Phone [facility] [address] [phone] Your Updated Medication List  
  
   
This list is accurate as of: 11/1/17  1:08 PM.  Always use your most recent med list.  
  
  
  
  
 aspirin delayed-release 81 mg tablet Take  by mouth daily. COREG 3.125 mg tablet Generic drug:  carvedilol Take  by mouth two (2) times daily (with meals). furosemide 40 mg tablet Commonly known as:  LASIX Take 1 Tab by mouth two (2) times a day. lisinopril 5 mg tablet Commonly known as:  Donnald Code Take  by mouth daily. multivitamin tablet Commonly known as:  ONE A DAY Take 1 Tab by mouth daily. potassium 99 mg tablet Take 99 mg by mouth daily. vitamin e 100 unit capsule Commonly known as:  E GEMS Take  by mouth daily. Follow-up Instructions Return in about 3 months (around 2/1/2018). Introducing [facility]! Berta Renteria introduces [employer] patient portal. Now you can access parts of your medical record, email your doctor's office, and request medication refills online. 1. In your internet browser, go to [email or web address]. [email or web address] 2. Click on the First Time User? Click Here link in the Sign In box. You will see the New Member Sign Up page. 3. Enter your [employer] Access Code exactly as it appears below. You will not need to use this code after youve completed the sign-up process. If you do not sign up before the expiration date, you must request a new code. · [employer] Access Code: [email or web address] Expires: 1/16/2018 10:05 AM 
 
4. Enter the last four digits of your Social Security Number (xxxx) and Date of Birth (mm/dd/yyyy) as indicated and click Submit. You will be taken to the next sign-up page. 5. Create a [employer] ID. This will be your [employer] login ID and cannot be changed, so think of one that is secure and easy to remember. 6. Create a [employer] password. You can change your password at any time. 7. Enter your Password Reset Question and Answer. This can be used at a later time if you forget your password. 8. Enter your e-mail address. You will receive e-mail notification when new information is available in [address] 9. Click Sign Up. You can now view and download portions of your medical record. 10. Click the Download Summary menu link to download a portable copy of your medical information. If you have questions, please visit the Frequently Asked Questions section of the [employer] website. Remember, [employer] is NOT to be used for urgent needs. For medical emergencies, dial 911. Now available from your iPhone and Android! Please provide this summary of care documentation to your next provider. Your primary care clinician is listed as Surjit Haro. If you have any questions after today's visit, please call [phone].

## 2017-11-01 NOTE — PROGRESS NOTES
1. Have you been to the ER, urgent care clinic since your last visit? Hospitalized since your last visit? No    2. Have you seen or consulted any other health care providers outside of the [address] [place] [address] since your last visit? Include any pap smears or colon screening.  No

## 2017-11-01 NOTE — PROGRESS NOTES
HISTORY OF PRESENT ILLNESS  Janeen Juarez is a 80 y.o. female. HPI Comments: Patient is here for follow up of diagnostic tests. Results will be discussed. Cardiomyopathy   The history is provided by the patient. This is a new problem. The problem occurs constantly. The problem has not changed since onset. Associated symptoms include shortness of breath. Pertinent negatives include no chest pain, no abdominal pain and no headaches. Nothing aggravates the symptoms. CHF   The history is provided by the patient. This is a new problem. The problem occurs constantly. The problem has not changed since onset. Associated symptoms include shortness of breath. Pertinent negatives include no chest pain, no abdominal pain and no headaches. The symptoms are aggravated by exertion and walking. The symptoms are relieved by rest.   Valvular Heart Disease   The history is provided by the patient. This is a chronic problem. The problem occurs constantly. The problem has not changed since onset. Associated symptoms include shortness of breath. Pertinent negatives include no chest pain, no abdominal pain and no headaches. Hypertension   The history is provided by the patient. This is a chronic problem. The problem occurs constantly. The problem has not changed since onset. Associated symptoms include shortness of breath. Pertinent negatives include no chest pain, no abdominal pain and no headaches. Shortness of Breath   The history is provided by the patient. This is a new problem. The problem occurs frequently. Episode onset: 3 months. The problem has been gradually worsening. Associated symptoms include leg swelling. Pertinent negatives include no fever, no headaches, no cough, no sputum production, no hemoptysis, no wheezing, no PND, no orthopnea, no chest pain, no vomiting, no abdominal pain, no rash and no claudication. Precipitated by: exertion.        Review of Systems   Constitutional: Negative for chills and fever.   HENT: Negative for nosebleeds. Eyes: Negative for blurred vision and double vision. Respiratory: Positive for shortness of breath. Negative for cough, hemoptysis, sputum production and wheezing. Cardiovascular: Positive for leg swelling. Negative for chest pain, palpitations, orthopnea, claudication and PND. Gastrointestinal: Negative for abdominal pain, heartburn, nausea and vomiting. Musculoskeletal: Negative for myalgias. Skin: Negative for rash. Neurological: Negative for dizziness, weakness and headaches. Endo/Heme/Allergies: Does not bruise/bleed easily. Family History   Problem Relation Age of Onset    Heart Attack Mother     No Known Problems Father        Past Medical History:   Diagnosis Date    Essential hypertension     Hyperlipidemia        No past surgical history on file. Social History   Substance Use Topics    Smoking status: Never Smoker    Smokeless tobacco: Never Used    Alcohol use No       No Known Allergies    Prior to Admission medications    Medication Sig Start Date End Date Taking? Authorizing Provider   carvedilol (COREG) 3.125 mg tablet Take  by mouth two (2) times daily (with meals). Yes Historical Provider   lisinopril (PRINIVIL, ZESTRIL) 5 mg tablet Take  by mouth daily. Yes Historical Provider   furosemide (LASIX) 40 mg tablet Take 1 Tab by mouth two (2) times a day. 7/26/17  Yes Eryn Antony MD   aspirin delayed-release 81 mg tablet Take  by mouth daily. Yes Historical Provider   potassium 99 mg tablet Take 99 mg by mouth daily. Yes Historical Provider   multivitamin (ONE A DAY) tablet Take 1 Tab by mouth daily. Yes Historical Provider   vitamin e (E GEMS) 100 unit capsule Take  by mouth daily. Yes Historical Provider         Visit Vitals    /69    Pulse 69    Ht 5' 2\" (1.575 m)    Wt 50.3 kg (111 lb)    BMI 20.3 kg/m2         Physical Exam   Constitutional: She is oriented to person, place, and time.  She appears well-developed and well-nourished. HENT:   Head: Normocephalic and atraumatic. Eyes: Conjunctivae are normal.   Neck: Neck supple. No JVD present. No tracheal deviation present. No thyromegaly present. Cardiovascular: Normal rate and regular rhythm. PMI is not displaced. Exam reveals no gallop, no S3 and no decreased pulses. No murmur heard. Pulmonary/Chest: No respiratory distress. She has no wheezes. She has no rales. She exhibits no tenderness. Abdominal: Soft. There is no tenderness. Musculoskeletal: She exhibits edema. Neurological: She is alert and oriented to person, place, and time. Skin: Skin is warm. Psychiatric: She has a normal mood and affect. Ms. Itzel Guerin has a reminder for a \"due or due soon\" health maintenance. I have asked that she contact her primary care provider for follow-up on this health maintenance. I have personally reviewed patients ekg done at other facility. ,emiliawp-2017  SUMMARY:echo-2017  Left ventricle: Systolic function was severely reduced. Ejection fraction  was estimated to be 20 %. There was severe diffuse hypokinesis. Wall  thickness was mildly increased. Features were consistent with a  pseudonormal left ventricular filling pattern, with concomitant abnormal  relaxation and increased filling pressure (grade 2 diastolic dysfunction). Ventricular septum: There was paradoxical motion. These changes are  consistent with RV volume overload. Right ventricle: The ventricle was dilated. Systolic function was reduced. Systolic pressure could not be accurately determined, but appeared to be  increased. Left atrium: The atrium was severely dilated. Right atrium: The atrium was dilated. Mitral valve: There was moderate regurgitation. Tricuspid valve: There was moderate to severe regurgitation. NUCLEAR IMAGIN2017     Findings:  1.  Post-stress imaging in short axis, horizontal and vertical long axis views reveals normal perfusion in all areas. 2. Resting images also show normal perfusion in all areas. 3. Gated images show severe global hypokinesis, severely reduced systolic function. The ejection fraction is 25%. Diagnosis:   1. Abnormal scan. 2. Normal perfusion scan. 3. Global hypokinesis with severely reduced systolic function indicating nonischemic cardiomyopathy. 4. Moderate risk scan. 4. Moderate risk scan. SUMMARY:10/2017-echo  Left ventricle: Systolic function was severely reduced. Ejection fraction  was estimated to be 20 %. There was severe diffuse hypokinesis. There was  mild concentric hypertrophy. Features were consistent with a pseudonormal  left ventricular filling pattern, with concomitant abnormal relaxation and  increased filling pressure (grade 2 diastolic dysfunction). Right ventricle: The ventricle was dilated. Systolic function was reduced. Left atrium: The atrium was severely dilated. Right atrium: The atrium was dilated. Mitral valve: There was mild annular calcification. There was moderate  regurgitation. The effective orifice of mitral regurgitation by proximal  isovelocity surface area was 0.19 cmï¾². The volume of mitral regurgitation  by proximal isovelocity surface area was 32 ml. Tricuspid valve: There was severe regurgitation. There was mild pulmonary  hypertension, although this appears to be an underestimate. Pulmonic valve: There was mild regurgitation. Inferior vena cava, hepatic veins: The inferior vena cava was markedly  dilated. The respirophasic change in diameter was less than 50%. Pericardium: There was a large plueral effusion present. A trivial  pericardial effusion was identified. Assessment         ICD-10-CM ICD-9-CM    1. Systolic CHF, acute (HCC) I50.21 428.21      428.0     still fatigue nyha class3   2. Cardiomyopathy, unspecified type ([address] I42.9 425.4     ef remains at 20%   3. Non-rheumatic mitral regurgitation I34.0 424.0     stable   4. Non-rheumatic tricuspid valve insufficiency I36.1 424.2     stable   5. Essential hypertension I10 401.9     controlled   9/2017  Increase coreg and lisinopril  Echo in 7-8 weeks-decide on icd  10/2017  Does not want icd-understands risk of scd  Also unable tot tolerate increase dose of coreg and lisinopril  Medications Discontinued During This Encounter   Medication Reason    lisinopril (PRINIVIL, ZESTRIL) 10 mg tablet Other    carvedilol (COREG) 6.25 mg tablet Other       No orders of the defined types were placed in this encounter. Follow-up Disposition:  Return in about 3 months (around 2/1/2018).

## 2018-01-22 ENCOUNTER — APPOINTMENT (OUTPATIENT)
Dept: MRI IMAGING | Age: 83
End: 2018-01-22
Attending: EMERGENCY MEDICINE
Payer: MEDICARE

## 2018-01-22 ENCOUNTER — APPOINTMENT (OUTPATIENT)
Dept: GENERAL RADIOLOGY | Age: 83
End: 2018-01-22
Attending: EMERGENCY MEDICINE
Payer: MEDICARE

## 2018-01-22 ENCOUNTER — HOSPITAL ENCOUNTER (EMERGENCY)
Age: 83
Discharge: HOME | End: 2018-01-23
Attending: EMERGENCY MEDICINE
Payer: MEDICARE

## 2018-01-22 DIAGNOSIS — R29.898 WEAKNESS OF BOTH LOWER EXTREMITIES: Primary | ICD-10-CM

## 2018-01-22 LAB
ALBUMIN SERPL-MCNC: 2.9 G/DL (ref 3.4–5)
ALBUMIN/GLOB SERPL: 0.7 {RATIO} (ref 0.8–1.7)
ALP SERPL-CCNC: 147 U/L (ref 45–117)
ALT SERPL-CCNC: 36 U/L (ref 13–56)
ANION GAP SERPL CALC-SCNC: 7 MMOL/L (ref 3–18)
AST SERPL-CCNC: 92 U/L (ref 15–37)
BASOPHILS # BLD: 0 K/UL (ref 0–0.1)
BASOPHILS NFR BLD: 0 % (ref 0–2)
BILIRUB SERPL-MCNC: 1.8 MG/DL (ref 0.2–1)
BUN SERPL-MCNC: 20 MG/DL (ref 7–18)
BUN/CREAT SERPL: 19 (ref 12–20)
CALCIUM SERPL-MCNC: 8.9 MG/DL (ref 8.5–10.1)
CHLORIDE SERPL-SCNC: 107 MMOL/L (ref 100–108)
CO2 SERPL-SCNC: 33 MMOL/L (ref 21–32)
CREAT SERPL-MCNC: 1.06 MG/DL (ref 0.6–1.3)
DIFFERENTIAL METHOD BLD: ABNORMAL
EOSINOPHIL # BLD: 0 K/UL (ref 0–0.4)
EOSINOPHIL NFR BLD: 0 % (ref 0–5)
ERYTHROCYTE [DISTWIDTH] IN BLOOD BY AUTOMATED COUNT: 18.3 % (ref 11.6–14.5)
GLOBULIN SER CALC-MCNC: 4.4 G/DL (ref 2–4)
GLUCOSE SERPL-MCNC: 98 MG/DL (ref 74–99)
HCT VFR BLD AUTO: 48.3 % (ref 35–45)
HGB BLD-MCNC: 16.6 G/DL (ref 12–16)
LYMPHOCYTES # BLD: 1 K/UL (ref 0.9–3.6)
LYMPHOCYTES NFR BLD: 18 % (ref 21–52)
MCH RBC QN AUTO: 32.9 PG (ref 24–34)
MCHC RBC AUTO-ENTMCNC: 34.4 G/DL (ref 31–37)
MCV RBC AUTO: 95.8 FL (ref 74–97)
MONOCYTES # BLD: 0.7 K/UL (ref 0.05–1.2)
MONOCYTES NFR BLD: 11 % (ref 3–10)
NEUTS SEG # BLD: 4.1 K/UL (ref 1.8–8)
NEUTS SEG NFR BLD: 71 % (ref 40–73)
PLATELET # BLD AUTO: 173 K/UL (ref 135–420)
PMV BLD AUTO: 11.2 FL (ref 9.2–11.8)
POTASSIUM SERPL-SCNC: 4 MMOL/L (ref 3.5–5.5)
PROT SERPL-MCNC: 7.3 G/DL (ref 6.4–8.2)
RBC # BLD AUTO: 5.04 M/UL (ref 4.2–5.3)
SODIUM SERPL-SCNC: 147 MMOL/L (ref 136–145)
WBC # BLD AUTO: 5.9 K/UL (ref 4.6–13.2)

## 2018-01-22 PROCEDURE — 80053 COMPREHEN METABOLIC PANEL: CPT | Performed by: EMERGENCY MEDICINE

## 2018-01-22 PROCEDURE — 71045 X-RAY EXAM CHEST 1 VIEW: CPT

## 2018-01-22 PROCEDURE — 99284 EMERGENCY DEPT VISIT MOD MDM: CPT

## 2018-01-22 PROCEDURE — 72158 MRI LUMBAR SPINE W/O & W/DYE: CPT

## 2018-01-22 PROCEDURE — 74011250636 HC RX REV CODE- 250/636: Performed by: EMERGENCY MEDICINE

## 2018-01-22 PROCEDURE — A9577 INJ MULTIHANCE: HCPCS | Performed by: EMERGENCY MEDICINE

## 2018-01-22 PROCEDURE — 85025 COMPLETE CBC W/AUTO DIFF WBC: CPT | Performed by: EMERGENCY MEDICINE

## 2018-01-22 RX ADMIN — GADOBENATE DIMEGLUMINE 10 ML: 529 INJECTION, SOLUTION INTRAVENOUS at 22:45

## 2018-01-22 NOTE — ED TRIAGE NOTES
Per medic: Family reports that patient has been getting increasingly weaker over the past week. Family also states patient has a foul odor to her urine. \"

## 2018-01-22 NOTE — ED PROVIDER NOTES
EMERGENCY DEPARTMENT HISTORY AND PHYSICAL EXAM    3:36 PM      Date: 1/22/2018  Patient Name: Fernando Chavez    History of Presenting Illness     Chief Complaint   Patient presents with    Fatigue         History Provided By: Patient and EMS    Additional History (Context): Fernando Chavez is a 80 y.o. female with a pertinent history of HTN and HLD presents to the ED via EMS for fatigue today. Per EMS, family states pt has been increasingly weak for the past week and has malodorous urine. Now in the ED, pt states she feels \"good so far. \" As the patient is without physical symptoms or complaints of pain, there is no severity of pain, quality of pain, duration, modifying factors, or associated signs and symptoms regarding the pt's presenting complaint. No other acute symptoms or complaints were noted. PCP: Minna Duverney, MD    Chief Complaint: Fatigue   Duration:  Weeks  Timing:  Progressive  Location: Generalized   Quality: N/A  Severity: N/A  Modifying Factors: No alleviating or exacerbating factors reported  Associated Symptoms: Malodorous urine      Current Outpatient Prescriptions   Medication Sig Dispense Refill    carvedilol (COREG) 3.125 mg tablet Take  by mouth two (2) times daily (with meals).  lisinopril (PRINIVIL, ZESTRIL) 5 mg tablet Take  by mouth daily.  furosemide (LASIX) 40 mg tablet Take 1 Tab by mouth two (2) times a day. 60 Tab 6    aspirin delayed-release 81 mg tablet Take  by mouth daily.  potassium 99 mg tablet Take 99 mg by mouth daily.  multivitamin (ONE A DAY) tablet Take 1 Tab by mouth daily.  vitamin e (E GEMS) 100 unit capsule Take  by mouth daily. Past History     Past Medical History:  Past Medical History:   Diagnosis Date    Essential hypertension     Hyperlipidemia        Past Surgical History:  No past surgical history on file.     Family History:  Family History   Problem Relation Age of Onset    Heart Attack Mother    Tess Rivera No Known Problems Father        Social History:  Social History   Substance Use Topics    Smoking status: Never Smoker    Smokeless tobacco: Never Used    Alcohol use No       Allergies:  No Known Allergies      Review of Systems     Review of Systems   Constitutional: Positive for fatigue. Genitourinary:        Malodorous urine   Neurological: Positive for weakness. All other systems reviewed and are negative. Physical Exam     Visit Vitals    /87 (BP 1 Location: Left arm, BP Patient Position: At rest)    Pulse 84    Temp 97.7 °F (36.5 °C)    Resp 16    Wt 60.8 kg (134 lb)    SpO2 100%    BMI 24.51 kg/m2     Physical Exam   Constitutional: She is oriented to person, place, and time. She appears well-developed. HENT:   Head: Normocephalic and atraumatic. Eyes: EOM are normal. Pupils are equal, round, and reactive to light. Neck: Normal range of motion. Neck supple. Cardiovascular: Normal rate, regular rhythm and normal heart sounds. Exam reveals no friction rub. No murmur heard. Pulmonary/Chest: Effort normal and breath sounds normal. No respiratory distress. She has no wheezes. Abdominal: Soft. She exhibits no distension. There is no tenderness. There is no rebound and no guarding. Musculoskeletal: Normal range of motion. Neurological: She is alert and oriented to person, place, and time. B/l LE weakness  2/5. UE 4/5   Skin: Skin is warm and dry. Psychiatric: She has a normal mood and affect. Her behavior is normal. Thought content normal.         Diagnostic Study Results         Medical Decision Making     2 weeks of decreased ability to ambulate; now unable to lift legs or get up. 2 weeks ago she was walkign and cooking per family. There is some swelling ? Weakness due to swelling vs cord issue. Possible GBS. Will obtain MRI    Signed out to dr Randy gomez MRI    Diagnosis     1.  Weakness of both lower extremities _______________________________    Attestations:  Scribe Attestation     rKishna Bosch acting as a scribe for and in the presence of Florin Colby MD      January 22, 2018 at 3:36 PM       Provider Attestation:      I personally performed the services described in the documentation, reviewed the documentation, as recorded by the scribe in my presence, and it accurately and completely records my words and actions. January 22, 2018 at 3:36 PM - Florin Colby MD    _______________________________    Note:  Assuming care of patient   from leaving provider    11:21 PM  Brooke Lane MD, assumed care of patient from another provider who is ending their shift in the emergency department . 11:21 PM    Date: 1/22/2018  Patient Name: Chavez Boynton    History of Presenting Illness     Chief Complaint   Patient presents with    Fatigue       Nursing nurses regarding the HPI and triage nursing notes were reviewed. Prior medical records were reviewed. Current Outpatient Prescriptions   Medication Sig Dispense Refill    carvedilol (COREG) 3.125 mg tablet Take  by mouth two (2) times daily (with meals).  lisinopril (PRINIVIL, ZESTRIL) 5 mg tablet Take  by mouth daily.  furosemide (LASIX) 40 mg tablet Take 1 Tab by mouth two (2) times a day. 60 Tab 6    aspirin delayed-release 81 mg tablet Take  by mouth daily.  potassium 99 mg tablet Take 99 mg by mouth daily.  multivitamin (ONE A DAY) tablet Take 1 Tab by mouth daily.  vitamin e (E GEMS) 100 unit capsule Take  by mouth daily. Past History     Past Medical History:  Past Medical History:   Diagnosis Date    Essential hypertension     Hyperlipidemia        Past Surgical History:  No past surgical history on file.     Family History:  Family History   Problem Relation Age of Onset    Heart Attack Mother     No Known Problems Father        Social History:  Social History   Substance Use Topics    Smoking status: Never Smoker    Smokeless tobacco: Never Used    Alcohol use No       Allergies:  No Known Allergies    Patient's primary care provider (as noted in EPIC):  [address], [place]    Abnormal lab results from this emergency department encounter:  Labs Reviewed   CBC WITH AUTOMATED DIFF - Abnormal; Notable for the following:        Result Value    HGB 16.6 (*)     HCT 48.3 (*)     RDW 18.3 (*)     LYMPHOCYTES 18 (*)     MONOCYTES 11 (*)     All other components within normal limits   METABOLIC PANEL, COMPREHENSIVE - Abnormal; Notable for the following:     Sodium 147 (*)     CO2 33 (*)     BUN 20 (*)     GFR est non-AA 50 (*)     Bilirubin, total 1.8 (*)     AST (SGOT) 92 (*)     Alk.  phosphatase 147 (*)     Albumin 2.9 (*)     Globulin 4.4 (*)     A-G Ratio 0.7 (*)     All other components within normal limits   URINALYSIS W/ RFLX MICROSCOPIC - Abnormal; Notable for the following:     Protein 30 (*)     Leukocyte Esterase SMALL (*)     All other components within normal limits   URINE MICROSCOPIC ONLY - Abnormal; Notable for the following:     Amorphous Crystals 2+ (*)     All other components within normal limits   CULTURE, URINE       Lab values for this patient within approximately the last 12 hours:  Recent Results (from the past 12 hour(s))   URINALYSIS W/ RFLX MICROSCOPIC    Collection Time: 01/23/18  3:25 AM   Result Value Ref Range    Color YELLOW      Appearance CLOUDY      Specific gravity 1.018 1.005 - 1.030      pH (UA) 7.0 5.0 - 8.0      Protein 30 (A) NEG mg/dL    Glucose NEGATIVE  NEG mg/dL    Ketone NEGATIVE  NEG mg/dL    Bilirubin NEGATIVE  NEG      Blood NEGATIVE  NEG      Urobilinogen 1.0 0.2 - 1.0 EU/dL    Nitrites NEGATIVE  NEG      Leukocyte Esterase SMALL (A) NEG     URINE MICROSCOPIC ONLY    Collection Time: 01/23/18  3:25 AM   Result Value Ref Range    WBC 0 to 5 0 - 4 /hpf    Epithelial cells 1+ 0 - 5 /lpf    Amorphous Crystals 2+ (A) NEG       Radiologist and cardiologist interpretations if available at time of this note:  Radiology results:  Xr Chest Port    Result Date: 1/22/2018  Portable chest x-ray, semierect AP view, time 1654 hours on 1/22/2018: INDICATION: Weakness. History of hypertension and hyperlipidemia. COMPARISON STUDY: None. FINDINGS: The study shows mild to moderate cardiomegaly. Pulmonary vascularity is not cephalized. In left lower lung field in the retrocardiac area there is dense opacity, indicating pneumonia, and/or atelectatic changes, with or without left basilar pleural effusion. At the right lung base there are minimal streaky fibrotic or atelectatic changes noted. IMPRESSION: Dense pneumonia, and/or atelectatic changes in the retrocardiac basal left lung, with or without left basilar pleural effusion. Minimal right basilar atelectasis. Subtle infiltrates not excluded. Mild to moderate cardiomegaly without evidence of CHF. MRI LUMB SPINE W WO CONT:  Radiologist Wet Read    Findings:  Severe central stenosis mainly from disc bulge, facet/ ligamentous hypertrophy at L4-5, followed by L3-4. foraminal stenosis with exiting L3 and L4 nerve root compression bilaterally. Large fibroid uterus with large calcified mass. presacral mass or distended rectosigmoid. rec CT pelvis. No inflammation/infection in lumbar spine. Medication(s) ordered for patient during this emergency visit encounter:  Medications   Gadobenate Dimeglumine (MULTIHANCE) 529 mg/mL (0.1mmol/0.2mL) contrast injection 10 mL (10 mL IntraVENous Given 1/22/18 7871)       Pt care assumed from Dr. Ron Ryan , ED provider. Pt complaint(s), current treatment plan, progression and available diagnostic results have been discussed thoroughly. Rounding occurred: no  Intended Disposition: TBD   Pending diagnostic reports and/or labs (please list): UA and MRI LS spine. 5:12 AM  On reexam, patients bilateral legs are 4/5 strength. Patient states she feels better. I believe she can f/u outpatient. Diagnoses:  1. Bilateral leg weakness over 2 weeks    SPECIFIC PATIENT INSTRUCTIONS FROM THE PHYSICIAN WHO TREATED YOU IN THE ER TODAY:  1. Return if worse. 2. Follow up with your primary doctor in the next 1-2 days for reevaluation. 3. Follow up with your orthopedist or the one listed in these discharge instructions in the next few days. Patient is improved, resting quietly and comfortably. The patient will be discharged home. The patient was reassured that these symptoms do not appear to represent a serious or life threatening condition at this time. Warning signs of worsening condition were discussed and understood by the patient. Based on patient's age, coexisting illness, exam, and the results of this ED evaluation, the decision to treat as an outpatient was made. Based on the information available at time of discharge, acute pathology requiring immediate intervention was deemed relative unlikely. While it is impossible to completely exclude the possibility of underlying serious disease or worsening of condition, I feel the relative likelihood is extremely low. I discussed this uncertainty with the patient, who understood ED evaluation and treatment and felt comfortable with the outpatient treatment plan. All questions regarding care, test results, and follow up were answered. The patient is stable and appropriate to discharge. They understand that they should return to the emergency department for any new or worsening symptoms. I stressed the importance of follow up for repeat assessment and possibly further evaluation/treatment. Coding Diagnoses     Clinical Impression:   1. Weakness of both lower extremities        Disposition     Disposition:  Home. Sosa Miller M.D.   TRACY Board Certified Emergency Physician

## 2018-01-23 VITALS
SYSTOLIC BLOOD PRESSURE: 144 MMHG | OXYGEN SATURATION: 100 % | WEIGHT: 134 LBS | TEMPERATURE: 97 F | HEART RATE: 86 BPM | RESPIRATION RATE: 17 BRPM | BODY MASS INDEX: 24.51 KG/M2 | DIASTOLIC BLOOD PRESSURE: 93 MMHG

## 2018-01-23 LAB
AMORPH CRY URNS QL MICRO: ABNORMAL
APPEARANCE UR: ABNORMAL
BILIRUB UR QL: NEGATIVE
COLOR UR: YELLOW
EPITH CASTS URNS QL MICRO: ABNORMAL /LPF (ref 0–5)
GLUCOSE UR STRIP.AUTO-MCNC: NEGATIVE MG/DL
HGB UR QL STRIP: NEGATIVE
KETONES UR QL STRIP.AUTO: NEGATIVE MG/DL
LEUKOCYTE ESTERASE UR QL STRIP.AUTO: ABNORMAL
NITRITE UR QL STRIP.AUTO: NEGATIVE
PH UR STRIP: 7 [PH] (ref 5–8)
PROT UR STRIP-MCNC: 30 MG/DL
SP GR UR REFRACTOMETRY: 1.02 (ref 1–1.03)
UROBILINOGEN UR QL STRIP.AUTO: 1 EU/DL (ref 0.2–1)
WBC URNS QL MICRO: ABNORMAL /HPF (ref 0–4)

## 2018-01-23 PROCEDURE — 87086 URINE CULTURE/COLONY COUNT: CPT | Performed by: EMERGENCY MEDICINE

## 2018-01-23 PROCEDURE — 81001 URINALYSIS AUTO W/SCOPE: CPT | Performed by: EMERGENCY MEDICINE

## 2018-01-23 NOTE — PROGRESS NOTES
Called cell phone of Madelyn Betlran.  Daughter of pt ; no answer; left message to inform that pt was discharged and needs a ride home

## 2018-01-23 NOTE — DISCHARGE INSTRUCTIONS
SPECIFIC PATIENT INSTRUCTIONS FROM THE PHYSICIAN WHO TREATED YOU IN THE ER TODAY:  1. Return if worse. 2. Follow up with your primary doctor in the next 1-2 days for reevaluation. 3. Follow up with your orthopedist or the one listed in these discharge instructions in the next few days. MyChart Activation    Thank you for requesting access to [facility]. Please follow the instructions below to securely access and download your online medical record. [facility] allows you to send messages to your doctor, view your test results, renew your prescriptions, schedule appointments, and more. How Do I Sign Up? 1. In your internet browser, go to [email or web address]. 2. Click on the First Time User? Click Here link in the Sign In box. You will see the New Member Sign Up page. 3. Enter your [facility] Access Code exactly as it appears below. You will not need to use this code after youve completed the sign-up process. If you do not sign up before the expiration date, you must request a new code. [facility] Access Code: [email or web address]  Expires: 2018  5:16 AM (This is the date your [facility] access code will )    4. Enter the last four digits of your Social Security Number (xxxx) and Date of Birth (mm/dd/yyyy) as indicated and click Submit. You will be taken to the next sign-up page. 5. Create a [facility] ID. This will be your [facility] login ID and cannot be changed, so think of one that is secure and easy to remember. 6. Create a [facility] password. You can change your password at any time. 7. Enter your Password Reset Question and Answer. This can be used at a later time if you forget your password. 8. Enter your e-mail address. You will receive e-mail notification when new information is available in [address] 9. Click Sign Up. You can now view and download portions of your medical record.   10. Click the Download Summary menu link to download a portable copy of your medical information. Additional Information    If you have questions, please visit the Frequently Asked Questions section of the [employer] website at [email or web address] Remember, [employer] is NOT to be used for urgent needs. For medical emergencies, dial 911.

## 2018-01-25 LAB
BACTERIA SPEC CULT: NORMAL
SERVICE CMNT-IMP: NORMAL

## 2021-07-07 NOTE — TELEPHONE ENCOUNTER
Called and spoke daughter about how patient is feeling, daughter states that patient is still feeling the same, please advise stated